# Patient Record
(demographics unavailable — no encounter records)

---

## 2025-01-08 NOTE — ASSESSMENT
[FreeTextEntry1] : 36M here in followup. Over the past 3 weeks he has had increased postnasal drip and some coughing. He wants to ensure he is ok since [place] is [place]. Since last seen 4-5 months ago overall he has done very well. He went on short course of abx 2 months ago which improved sx quickly. Prior to that he had nasal valve surgery 10/2024 and has recovered very well form that and is breathing better than ever. Remains on BID budesonide rinses. He has had 3-4 infections over the past 3 years or so requiring extended steroids/abx courses. 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 Intraoperatively, sphenoid ostia were occluded due to polypoid edema. On exam, nasal endoscopy shows widely patent paranasal sinuses and nasal airways with no mucopus or polyps. There is mild edema and mucus really just in the left anterior ethmoids. He is a complex pt with eosinophilic sinusitis/nasal polyps, facial pain and migraine. He has done quite well this past few years. He has had some mild persistent sx the past 3 weeks - there is only minimal left ethmoid edema - given his history I will give course clinda/dex for him to have - he will take them in case sx worsen and keep me posted. Continue dupixent/budesonide for now.

## 2025-01-08 NOTE — HISTORY OF PRESENT ILLNESS
[de-identified] : 36M here in followup.  Over the past 3 weeks he has had increased postnasal drip and some coughing. He wants to ensure he is ok since he is travelling next [place]. Since last seen 4-5 months ago overall he has done very well. He went on short course of abx 2 months ago which improved sx quickly. Prior to that he had nasal valve surgery 10/2024 and has recovered very well form that and is breathing better than ever.  He remains on dupixent and that is going well. Remains on BID budesonide rinses.  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 During this time, sx worsened by covid infection.  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 Intraoperatively, sphenoid ostia were occluded due to polypoid edema and likely were never opened in the past and lined by moderately severe mucosal edema; additional ethmoid septations were removed as well, but there was no overt mucopus. Initial postop course c/b MRSA sinusitis - s/p 3 weeks clinda with 4 days of medrol (32mg each) and improved.   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  He has complex history of chronic pansinusitis, migraines, facial pain and strong allergic history. His sinusitis, migraines and allergic mucosal disease all play against one another and act as triggers for each.  In terms of allergy/immunology - immune workup was normal on recent labs. He had been on IVIG in the past and had done well on it, but insurance failed to cover it after a while.   ROS otherwise negative.

## 2025-01-08 NOTE — PROCEDURE
[FreeTextEntry3] : Nasal Endoscopy: nasal airways widely patent paranasal sinuses widely patent mild edema and mucus left anterior ethmoid area - cx taken no mucin, mucopus or polyps

## 2025-01-08 NOTE — CONSULT LETTER
[Dear  ___] : Dear  [unfilled], [Courtesy Letter:] : I had the pleasure of seeing your patient, [unfilled], in my office today. [Consult Closing:] : Thank you very much for allowing me to participate in the care of this patient.  If you have any questions, please do not hesitate to contact me. [Sincerely,] : Sincerely, [DrNicolle ___] : Dr. LY [Miguel Otoole MD] : Miguel Otoole MD  [Department of Otolaryngology, Head and Neck Surgery] : Department of Otolaryngology, Head and Neck Surgery [[facility]] : [facility]

## 2025-01-08 NOTE — PHYSICAL EXAM
[Nasal Endoscopy Performed] : nasal endoscopy was performed, see procedure section for findings [Midline] : trachea located in midline position [Normal] : no rashes [FreeTextEntry1] : no facial/frontal pressure and pain on palpation [de-identified] : no drainage, posterior opx clear

## 2025-03-25 NOTE — CONSULT LETTER
[Dear  ___] : Dear  [unfilled], [Courtesy Letter:] : I had the pleasure of seeing your patient, [unfilled], in my office today. [Consult Closing:] : Thank you very much for allowing me to participate in the care of this patient.  If you have any questions, please do not hesitate to contact me. [Sincerely,] : Sincerely, [DrNicolle ___] : Dr. LY [Miguel Otoole MD] : Miguel Otoole MD  [Department of Otolaryngology, Head and Neck Surgery] : Department of Otolaryngology, Head and Neck Surgery [[facility]] : [facility]

## 2025-03-25 NOTE — PHYSICAL EXAM
[Nasal Endoscopy Performed] : nasal endoscopy was performed, see procedure section for findings [Midline] : trachea located in midline position [Normal] : no rashes [FreeTextEntry1] : no facial/frontal pressure and pain on palpation [de-identified] : no drainage, posterior opx clear

## 2025-03-25 NOTE — ASSESSMENT
[FreeTextEntry1] : 36M here in followup. Over the past 10 days he feels frontal headache, coughing, ear crackling, increased mucus, postnasal drip and hoarseness. Zpak did not help. He is here in followup. He remains on budesonide rinses and dupixent. Otherwise since last seen 3 months ago overall he has done very well. He has had around 3-4 infections over the past 3-4 years or so requiring extended steroids/abx courses. On exam, nasal endoscopy shows widely patent paranasal sinuses and nasal airways with no mucopus or polyps. There is mild left>right frontoethmoid polypoid mucosal edema.  He has frontoethmoid edema as above, but no drainage or obvious mucopus. He is a complex pt with eosinophilic sinusitis/nasal polyps, facial pain and migraine. He has done quite well this past few years. He has had persistent sx the past 10 days - given his history and exam, I will give course clinda/dex. Continue dupixent/budesonide for now.

## 2025-03-25 NOTE — PROCEDURE
[FreeTextEntry3] : Nasal Endoscopy: nasal airways widely patent paranasal sinuses widely patent mild L>R frontoethmoid polypoid edema - cx taken no mucin, mucopus or polyps

## 2025-03-25 NOTE — HISTORY OF PRESENT ILLNESS
[de-identified] : 36M here in followup.  Over the past 10 days he feels frontal headache, coughing, ear crackling, increased mucus, postnasal drip and hoarseness. Zpak did not help. He is here in followup. He remains on budesonide rinses and dupixent. Otherwise since last seen 3 months ago overall he has done very well.   He went on short course of abx/steroids 1/2025 and prior to that a short course abx 11/2024 which improved sx quickly. Prior to that he had nasal valve surgery 10/2024 and has recovered very well form that and is breathing better than ever.  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 During this time, sx worsened by covid infection.  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 Intraoperatively, sphenoid ostia were occluded due to polypoid edema and likely were never opened in the past and lined by moderately severe mucosal edema; additional ethmoid septations were removed as well, but there was no overt mucopus. Initial postop course c/b MRSA sinusitis - s/p 3 weeks clinda with 4 days of medrol (32mg each) and improved.   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  He has complex history of chronic pansinusitis, migraines, facial pain and strong allergic history. His sinusitis, migraines and allergic mucosal disease all play against one another and act as triggers for each.  In terms of allergy/immunology - immune workup was normal on recent labs. He had been on IVIG in the past and had done well on it, but insurance failed to cover it after a while.   ROS otherwise negative.